# Patient Record
Sex: FEMALE | Race: WHITE | NOT HISPANIC OR LATINO
[De-identification: names, ages, dates, MRNs, and addresses within clinical notes are randomized per-mention and may not be internally consistent; named-entity substitution may affect disease eponyms.]

---

## 2017-04-18 ENCOUNTER — TRANSCRIPTION ENCOUNTER (OUTPATIENT)
Age: 32
End: 2017-04-18

## 2017-10-12 ENCOUNTER — APPOINTMENT (OUTPATIENT)
Dept: DERMATOLOGY | Facility: CLINIC | Age: 32
End: 2017-10-12

## 2017-11-06 ENCOUNTER — APPOINTMENT (OUTPATIENT)
Dept: DERMATOLOGY | Facility: CLINIC | Age: 32
End: 2017-11-06

## 2018-02-26 ENCOUNTER — TRANSCRIPTION ENCOUNTER (OUTPATIENT)
Age: 33
End: 2018-02-26

## 2018-05-24 ENCOUNTER — APPOINTMENT (OUTPATIENT)
Dept: OBGYN | Facility: CLINIC | Age: 33
End: 2018-05-24
Payer: COMMERCIAL

## 2018-05-24 ENCOUNTER — TRANSCRIPTION ENCOUNTER (OUTPATIENT)
Age: 33
End: 2018-05-24

## 2018-05-24 VITALS
SYSTOLIC BLOOD PRESSURE: 131 MMHG | BODY MASS INDEX: 20.73 KG/M2 | DIASTOLIC BLOOD PRESSURE: 83 MMHG | HEIGHT: 66 IN | WEIGHT: 129 LBS

## 2018-05-24 DIAGNOSIS — Z86.39 PERSONAL HISTORY OF OTHER ENDOCRINE, NUTRITIONAL AND METABOLIC DISEASE: ICD-10-CM

## 2018-05-24 DIAGNOSIS — F11.10 OPIOID ABUSE, UNCOMPLICATED: ICD-10-CM

## 2018-05-24 DIAGNOSIS — Z01.419 ENCOUNTER FOR GYNECOLOGICAL EXAMINATION (GENERAL) (ROUTINE) W/OUT ABNORMAL FINDINGS: ICD-10-CM

## 2018-05-24 DIAGNOSIS — N92.3 OVULATION BLEEDING: ICD-10-CM

## 2018-05-24 DIAGNOSIS — Z87.59 PERSONAL HISTORY OF OTHER COMPLICATIONS OF PREGNANCY, CHILDBIRTH AND THE PUERPERIUM: ICD-10-CM

## 2018-05-24 PROCEDURE — 99385 PREV VISIT NEW AGE 18-39: CPT

## 2018-05-24 RX ORDER — CYCLOBENZAPRINE HYDROCHLORIDE 5 MG/1
5 TABLET, FILM COATED ORAL
Qty: 15 | Refills: 0 | Status: COMPLETED | COMMUNITY
Start: 2018-02-26

## 2018-05-24 RX ORDER — MELOXICAM 15 MG/1
15 TABLET ORAL
Qty: 30 | Refills: 0 | Status: COMPLETED | COMMUNITY
Start: 2017-05-12

## 2018-05-24 RX ORDER — OSELTAMIVIR PHOSPHATE 75 MG/1
75 CAPSULE ORAL
Qty: 10 | Refills: 0 | Status: COMPLETED | COMMUNITY
Start: 2018-02-23

## 2018-05-24 RX ORDER — IBUPROFEN 600 MG/1
600 TABLET, FILM COATED ORAL
Qty: 21 | Refills: 0 | Status: COMPLETED | COMMUNITY
Start: 2018-02-26

## 2018-05-24 RX ORDER — BUPRENORPHINE AND NALOXONE 8; 2 MG/1; MG/1
8-2 TABLET SUBLINGUAL
Qty: 60 | Refills: 0 | Status: ACTIVE | COMMUNITY
Start: 2017-09-01

## 2018-05-29 ENCOUNTER — ASOB RESULT (OUTPATIENT)
Age: 33
End: 2018-05-29

## 2018-05-29 ENCOUNTER — TRANSCRIPTION ENCOUNTER (OUTPATIENT)
Age: 33
End: 2018-05-29

## 2018-05-29 ENCOUNTER — APPOINTMENT (OUTPATIENT)
Dept: OBGYN | Facility: CLINIC | Age: 33
End: 2018-05-29
Payer: COMMERCIAL

## 2018-05-29 LAB
C TRACH RRNA SPEC QL NAA+PROBE: NOT DETECTED
HPV HIGH+LOW RISK DNA PNL CVX: NOT DETECTED
N GONORRHOEA RRNA SPEC QL NAA+PROBE: NOT DETECTED
SOURCE AMPLIFICATION: NORMAL
SOURCE AMPLIFICATION: NORMAL
T VAGINALIS RRNA SPEC QL NAA+PROBE: NOT DETECTED

## 2018-05-29 PROCEDURE — 76830 TRANSVAGINAL US NON-OB: CPT

## 2018-05-31 LAB — CYTOLOGY CVX/VAG DOC THIN PREP: NORMAL

## 2018-07-16 ENCOUNTER — OUTPATIENT (OUTPATIENT)
Dept: OUTPATIENT SERVICES | Facility: HOSPITAL | Age: 33
LOS: 1 days | End: 2018-07-16

## 2018-07-16 VITALS
OXYGEN SATURATION: 99 % | HEART RATE: 80 BPM | RESPIRATION RATE: 16 BRPM | HEIGHT: 65.5 IN | WEIGHT: 123.9 LBS | TEMPERATURE: 99 F | DIASTOLIC BLOOD PRESSURE: 80 MMHG | SYSTOLIC BLOOD PRESSURE: 132 MMHG

## 2018-07-16 DIAGNOSIS — N84.0 POLYP OF CORPUS UTERI: ICD-10-CM

## 2018-07-16 DIAGNOSIS — T78.40XA ALLERGY, UNSPECIFIED, INITIAL ENCOUNTER: ICD-10-CM

## 2018-07-16 DIAGNOSIS — F19.20 OTHER PSYCHOACTIVE SUBSTANCE DEPENDENCE, UNCOMPLICATED: ICD-10-CM

## 2018-07-16 DIAGNOSIS — Z98.890 OTHER SPECIFIED POSTPROCEDURAL STATES: Chronic | ICD-10-CM

## 2018-07-16 LAB
ALBUMIN SERPL ELPH-MCNC: 4.8 G/DL — SIGNIFICANT CHANGE UP (ref 3.3–5)
ALP SERPL-CCNC: 46 U/L — SIGNIFICANT CHANGE UP (ref 40–120)
ALT FLD-CCNC: 12 U/L — SIGNIFICANT CHANGE UP (ref 4–33)
AST SERPL-CCNC: 17 U/L — SIGNIFICANT CHANGE UP (ref 4–32)
BILIRUB SERPL-MCNC: 0.3 MG/DL — SIGNIFICANT CHANGE UP (ref 0.2–1.2)
BLD GP AB SCN SERPL QL: NEGATIVE — SIGNIFICANT CHANGE UP
BUN SERPL-MCNC: 15 MG/DL — SIGNIFICANT CHANGE UP (ref 7–23)
CALCIUM SERPL-MCNC: 9.4 MG/DL — SIGNIFICANT CHANGE UP (ref 8.4–10.5)
CHLORIDE SERPL-SCNC: 99 MMOL/L — SIGNIFICANT CHANGE UP (ref 98–107)
CO2 SERPL-SCNC: 25 MMOL/L — SIGNIFICANT CHANGE UP (ref 22–31)
CREAT SERPL-MCNC: 0.69 MG/DL — SIGNIFICANT CHANGE UP (ref 0.5–1.3)
GLUCOSE SERPL-MCNC: 81 MG/DL — SIGNIFICANT CHANGE UP (ref 70–99)
HCG SERPL-ACNC: < 5 MIU/ML — SIGNIFICANT CHANGE UP
HCT VFR BLD CALC: 42.3 % — SIGNIFICANT CHANGE UP (ref 34.5–45)
HGB BLD-MCNC: 14 G/DL — SIGNIFICANT CHANGE UP (ref 11.5–15.5)
MCHC RBC-ENTMCNC: 30.9 PG — SIGNIFICANT CHANGE UP (ref 27–34)
MCHC RBC-ENTMCNC: 33.1 % — SIGNIFICANT CHANGE UP (ref 32–36)
MCV RBC AUTO: 93.4 FL — SIGNIFICANT CHANGE UP (ref 80–100)
NRBC # FLD: 0 — SIGNIFICANT CHANGE UP
PLATELET # BLD AUTO: 176 K/UL — SIGNIFICANT CHANGE UP (ref 150–400)
PMV BLD: 11.2 FL — SIGNIFICANT CHANGE UP (ref 7–13)
POTASSIUM SERPL-MCNC: 4 MMOL/L — SIGNIFICANT CHANGE UP (ref 3.5–5.3)
POTASSIUM SERPL-SCNC: 4 MMOL/L — SIGNIFICANT CHANGE UP (ref 3.5–5.3)
PROT SERPL-MCNC: 7.7 G/DL — SIGNIFICANT CHANGE UP (ref 6–8.3)
RBC # BLD: 4.53 M/UL — SIGNIFICANT CHANGE UP (ref 3.8–5.2)
RBC # FLD: 12.5 % — SIGNIFICANT CHANGE UP (ref 10.3–14.5)
RH IG SCN BLD-IMP: NEGATIVE — SIGNIFICANT CHANGE UP
SODIUM SERPL-SCNC: 138 MMOL/L — SIGNIFICANT CHANGE UP (ref 135–145)
WBC # BLD: 8.97 K/UL — SIGNIFICANT CHANGE UP (ref 3.8–10.5)
WBC # FLD AUTO: 8.97 K/UL — SIGNIFICANT CHANGE UP (ref 3.8–10.5)

## 2018-07-16 NOTE — H&P PST ADULT - NSANTHOSAYNRD_GEN_A_CORE
No. MIK screening performed.  STOP BANG Legend: 0-2 = LOW Risk; 3-4 = INTERMEDIATE Risk; 5-8 = HIGH Risk

## 2018-07-16 NOTE — H&P PST ADULT - FAMILY HISTORY
Mother  Still living? Yes, Estimated age: Age Unknown  Family history of cervical cancer, Age at diagnosis: Age Unknown     Grandparent  Still living? No  Family history of diabetes mellitus, Age at diagnosis: Age Unknown

## 2018-07-16 NOTE — H&P PST ADULT - NSALCOHOLUSECOMMENT_GEN_ALL_CORE_FT
"Subjective:      Adriana Peryr is a 36 y.o. female who presents with Sinus Problem (sinus, ear pain x1 week)            1 week nasal congestion, ear pressure, no drainage from ears, no trauma/barotrauma. Productive cough without blood in sputum. No fever. No SOB. Intermittent wheeze with cough. No PMH asthma/pneumonia. OTC dayquil with min relief. No other aggravating or alleviating factors.          Review of Systems   Constitutional: Positive for malaise/fatigue. Negative for weight loss.   Eyes: Negative for discharge and redness.   Gastrointestinal: Positive for nausea (with dizziness yesterday, now resolved).   Musculoskeletal: Negative for joint pain, myalgias and neck pain.   Skin: Negative for itching and rash.   Neurological: Positive for dizziness (yesterday, now resolved).     .  Medications, Allergies, and current problem list reviewed today in Epic       Objective:     /88   Pulse 85   Temp 37 °C (98.6 °F)   Ht 1.575 m (5' 2\")   Wt 81.6 kg (180 lb)   SpO2 96%   BMI 32.92 kg/m²      Physical Exam   Constitutional: She appears well-developed and well-nourished. No distress.   HENT:   Head: Normocephalic and atraumatic.   Mouth/Throat: Oropharynx is clear and moist.   Nasal congestion  TM's bulging and red with preserved light reflex bilateral   Eyes: Conjunctivae are normal.   Neck: Neck supple.   Cardiovascular: Normal rate, regular rhythm and normal heart sounds.    Pulmonary/Chest: Effort normal and breath sounds normal.   Lymphadenopathy:     She has no cervical adenopathy.   Neurological:   Speech is clear. Patient is appropriate and cooperative.     Skin: Skin is warm and dry. No rash noted.               Assessment/Plan:     1. Acute URI     2. Cough     3. Acute otitis media, unspecified otitis media type  amoxicillin (AMOXIL) 875 MG tablet     Differential diagnosis, natural history, supportive care, and indications for immediate follow-up discussed at length.     "
denies alcohol use

## 2018-07-16 NOTE — H&P PST ADULT - PMH
Anxiety    Hyperthyroidism  patient states resolved had blood work recently with Dr. Acosta - normal  Panic disorder    Vertigo Anxiety    Hyperthyroidism  @ 23 y.o.tx x few years ;  patient states resolved had blood work recently with Dr. Acosta - michael [ 2018}  Panic disorder  7/16/18 pt denies  Vertigo Addiction to drug  Pt reports addiction to narcotics ; pain medication x 6 years dc d 2013. pt presently on suboxone  Anxiety    Hyperthyroidism  @ 23 y.o.tx x few years ;  patient states resolved had blood work recently with Dr. Acosta - michael [ 2018}  Panic disorder  7/16/18 pt denies  Vertigo

## 2018-07-16 NOTE — H&P PST ADULT - PROBLEM SELECTOR PLAN 1
Dilation Curettage Hysteroscopy  Polypectomy with Symphion    Pre op instructions reviewed with pt ; pt appears to have a good understanding of pre op instructions    Pt to DR shankar for pre op m/c

## 2018-07-16 NOTE — H&P PST ADULT - NSSTREETDRUGTY_GEN_ALL_CORE_SD
oxycontin ; prescription pain medications x 6-7 years dc d 2013 oxycontin ; prescription pain medications x 6 years dc d 2013

## 2018-07-16 NOTE — H&P PST ADULT - PROBLEM SELECTOR PLAN 2
Regarding suboxone; pt to see DR Acosta  regarding pre op suboxone plan Pt to see Dr Acosta for pre op medical evaluation and  ;  regarding pre op suboxone plan Pt to see Dr Acosta for pre op medical evaluation and  ;  regarding pre op suboxone plan    7/18/18; regarding suboxone ; reviewed with Dr Linn; as long as surgeon is in agreement ; pt may stay on suboxone ; hold am of surgery  Call to Dr Barth ;  Call to DR Acosta ; s/w Zahraa message left awaiting return call Pt to see Dr Acosta for pre op medical evaluation and  ;  regarding pre op suboxone plan    7/18/18; regarding suboxone ; reviewed with Dr Linn; as long as surgeon is in agreement ; pt may stay on suboxone ; hold am of surgery  Call to Dr Barth ; not in office 7/18  Call to DR Acosta ; s/w Zahraa message left awaiting return call    Call to pt to review ; no answer

## 2018-07-16 NOTE — H&P PST ADULT - HISTORY OF PRESENT ILLNESS
Pt is a 32 y.o. female ; pt reports h/o irregular menses x 3 months ; pt to surgeon ; a sonogram was done " there is a polyp " pt now presents for Dilation curettage Hysteroscopy Polypectomy with Symphion Pt is a 32 y.o. female ; pt reports h/o irregular menses x 3 months ; pt to surgeon ; a sonogram was done " there is a polyp " pt now presents for Dilation Curettage Hysteroscopy Polypectomy with Symphion

## 2018-07-17 PROBLEM — F19.20 OTHER PSYCHOACTIVE SUBSTANCE DEPENDENCE, UNCOMPLICATED: Chronic | Status: ACTIVE | Noted: 2018-07-16

## 2018-07-23 ENCOUNTER — TRANSCRIPTION ENCOUNTER (OUTPATIENT)
Age: 33
End: 2018-07-23

## 2018-07-24 ENCOUNTER — RESULT REVIEW (OUTPATIENT)
Age: 33
End: 2018-07-24

## 2018-07-24 ENCOUNTER — APPOINTMENT (OUTPATIENT)
Dept: OBGYN | Facility: HOSPITAL | Age: 33
End: 2018-07-24

## 2018-07-24 ENCOUNTER — OUTPATIENT (OUTPATIENT)
Dept: OUTPATIENT SERVICES | Facility: HOSPITAL | Age: 33
LOS: 1 days | Discharge: HOME | End: 2018-07-24
Payer: COMMERCIAL

## 2018-07-24 VITALS
TEMPERATURE: 98 F | OXYGEN SATURATION: 96 % | SYSTOLIC BLOOD PRESSURE: 127 MMHG | RESPIRATION RATE: 11 BRPM | HEART RATE: 76 BPM | DIASTOLIC BLOOD PRESSURE: 72 MMHG

## 2018-07-24 VITALS
DIASTOLIC BLOOD PRESSURE: 82 MMHG | HEART RATE: 90 BPM | RESPIRATION RATE: 18 BRPM | TEMPERATURE: 98 F | HEIGHT: 65 IN | OXYGEN SATURATION: 99 % | SYSTOLIC BLOOD PRESSURE: 132 MMHG | WEIGHT: 123.9 LBS

## 2018-07-24 DIAGNOSIS — Z98.890 OTHER SPECIFIED POSTPROCEDURAL STATES: Chronic | ICD-10-CM

## 2018-07-24 DIAGNOSIS — N84.0 POLYP OF CORPUS UTERI: ICD-10-CM

## 2018-07-24 LAB — RH IG SCN BLD-IMP: NEGATIVE — SIGNIFICANT CHANGE UP

## 2018-07-24 PROCEDURE — 58562 HYSTEROSCOPY REMOVE FB: CPT

## 2018-07-24 PROCEDURE — 88305 TISSUE EXAM BY PATHOLOGIST: CPT | Mod: 26

## 2018-07-24 RX ORDER — ACETAMINOPHEN 500 MG
2 TABLET ORAL
Qty: 0 | Refills: 0 | COMMUNITY

## 2018-07-24 RX ORDER — MECLIZINE HCL 12.5 MG
1 TABLET ORAL
Qty: 0 | Refills: 0 | COMMUNITY

## 2018-07-24 RX ORDER — SODIUM CHLORIDE 9 MG/ML
1000 INJECTION, SOLUTION INTRAVENOUS
Qty: 0 | Refills: 0 | Status: DISCONTINUED | OUTPATIENT
Start: 2018-07-24 | End: 2018-07-24

## 2018-07-24 RX ORDER — IBUPROFEN 200 MG
3 TABLET ORAL
Qty: 0 | Refills: 0 | COMMUNITY

## 2018-07-24 RX ORDER — SODIUM CHLORIDE 9 MG/ML
1000 INJECTION, SOLUTION INTRAVENOUS
Qty: 0 | Refills: 0 | Status: DISCONTINUED | OUTPATIENT
Start: 2018-07-24 | End: 2018-07-25

## 2018-07-24 RX ORDER — ONDANSETRON 8 MG/1
4 TABLET, FILM COATED ORAL ONCE
Qty: 0 | Refills: 0 | Status: DISCONTINUED | OUTPATIENT
Start: 2018-07-24 | End: 2018-07-25

## 2018-07-24 RX ORDER — BUPRENORPHINE AND NALOXONE 2; .5 MG/1; MG/1
2 TABLET SUBLINGUAL
Qty: 0 | Refills: 0 | COMMUNITY

## 2018-07-24 RX ADMIN — SODIUM CHLORIDE 125 MILLILITER(S): 9 INJECTION, SOLUTION INTRAVENOUS at 16:12

## 2018-07-24 NOTE — ASU DISCHARGE PLAN (ADULT/PEDIATRIC). - ACTIVITY LEVEL
no douching/nothing per vagina/no tampons/no intercourse/no tub baths no tampons/no tub baths/nothing per vagina/no douching/Nothing in vagina, no intercourse, no douching, no tampons, no tub baths, and no swimming for about 2 weeks or until cleared by MD. Contact your doctor if you are soaking a pad every hour, experience foul smelling discharge, or are noticing blood clots dime-sized or the size of your fist on your pad./no intercourse

## 2018-07-24 NOTE — ASU DISCHARGE PLAN (ADULT/PEDIATRIC). - SPECIAL INSTRUCTIONS
Regular diet.  Resume normal activity as tolerated.  Do not insert anything in vagina (Sex, tampons, douching) for 2 to 4 wks. Call your doctor with any signs and symptoms of infection such as fever, chills, nausea or vomiting. Call your doctor if you're unable to tolerate food or have difficulty urinating.  Call your doctor if you have pain that is not relieved by your prescribed medications.  Notify your doctor with any other concerns.  Follow up with Dr. Hawkins  in 2 weeks.

## 2018-07-24 NOTE — ASU DISCHARGE PLAN (ADULT/PEDIATRIC). - MEDICATION SUMMARY - MEDICATIONS TO TAKE
I will START or STAY ON the medications listed below when I get home from the hospital:    ibuprofen 200 mg oral tablet  -- 3 tab(s) by mouth every 6 hours, As Needed  -- Indication: For Postop pain as needed    Suboxone 8 mg-2 mg sublingual tablet  -- 2 tab(s) under tongue 2 times a day  -- Indication: For h    Aleve 220 mg oral tablet  -- 1 tab(s) by mouth every 8 hours, As Needed  -- Indication: For home    Tylenol 500 mg oral tablet  -- 2 tab(s) by mouth every 6 hours, As Needed  -- Indication: For Pain as needed    meclizine 25 mg oral tablet  -- 1 tab(s) by mouth 3 times a day, As Needed  -- Indication: For home med    Multiple Vitamins oral tablet  -- 1 tab(s) by mouth once a day LD 7/15/18  -- Indication: For home med

## 2018-07-24 NOTE — ASU DISCHARGE PLAN (ADULT/PEDIATRIC). - NOTIFY
GYN Fever>100.4/Numbness, tingling/Bleeding that does not stop/Persistent Nausea and Vomiting/Unable to Urinate/Pain not relieved by Medications/Signs of infection: redness around surgical site, hot and tender to the touch, pus drainage of any kind accompanied by foul odor

## 2018-07-24 NOTE — ASU DISCHARGE PLAN (ADULT/PEDIATRIC). - NURSING INSTRUCTIONS
You were given IV Tylenol (1000mg) for pain management in the Operating Room.  Please do NOT take tylenol/acetaminophen products (Percocet, Vicodin, Excedrin) for the next 6-8 hours (after  930PM).  You were given IV Toradol for pain management in the Operating Room. Please do NOT take Ibuprofen (NSAIDS) products (Motrin, Aleve, Advil) for the next 6-8 hours (After  930PM).   You were given IV antibiotics in the OR. IF you are prescribed oral antibiotics to take at home. Please follow directions on the bottle and start your first dose of antibiotics before bedtime, and schedule your own timing until you finish all antibiotics. DO NOT DISCONTINUE ON YOUR OWN.   Refer to medication reconcillation sheet attached with discharge instruction paperwork.

## 2018-07-24 NOTE — BRIEF OPERATIVE NOTE - OPERATION/FINDINGS
EUA - small anteverted uterus, mobile, no palpable adnexal masses  Hysteroscopy revealed ~4cm polypoid mass extending to internal os, base at left cornual end. Bilateral ostia identified  Small cervical laceration on tenaculum site repaired with figure of 8 stitch. Hemostasis achieved.

## 2018-07-24 NOTE — BRIEF OPERATIVE NOTE - PROCEDURE
<<-----Click on this checkbox to enter Procedure Endocervical curettage  07/24/2018    Active  [email or web address]  Polypectomy of uterus using hysteroscopic tissue removal system with disposable rotating reciprocating cutting blade  07/24/2018    Active  [email or web address]  Hysteroscopy with dilation and curettage of uterus  07/24/2018    Active  [email or web address]

## 2019-01-24 ENCOUNTER — APPOINTMENT (OUTPATIENT)
Dept: OBGYN | Facility: CLINIC | Age: 34
End: 2019-01-24

## 2020-12-16 PROBLEM — Z01.419 ENCOUNTER FOR ANNUAL ROUTINE GYNECOLOGICAL EXAMINATION: Status: RESOLVED | Noted: 2018-05-24 | Resolved: 2020-12-16

## 2023-02-16 ENCOUNTER — APPOINTMENT (OUTPATIENT)
Dept: ORTHOPEDIC SURGERY | Facility: CLINIC | Age: 38
End: 2023-02-16
Payer: COMMERCIAL

## 2023-02-16 VITALS — BODY MASS INDEX: 19.99 KG/M2 | HEIGHT: 65 IN | WEIGHT: 120 LBS

## 2023-02-16 DIAGNOSIS — M17.12 UNILATERAL PRIMARY OSTEOARTHRITIS, LEFT KNEE: ICD-10-CM

## 2023-02-16 DIAGNOSIS — M23.92 UNSPECIFIED INTERNAL DERANGEMENT OF LEFT KNEE: ICD-10-CM

## 2023-02-16 DIAGNOSIS — Z98.890 OTHER SPECIFIED POSTPROCEDURAL STATES: ICD-10-CM

## 2023-02-16 PROCEDURE — 99213 OFFICE O/P EST LOW 20 MIN: CPT

## 2023-02-16 PROCEDURE — 73564 X-RAY EXAM KNEE 4 OR MORE: CPT | Mod: LT

## 2023-02-16 RX ORDER — LEVOTHYROXINE SODIUM 75 UG/1
75 TABLET ORAL
Refills: 0 | Status: ACTIVE | COMMUNITY

## 2023-02-16 NOTE — PHYSICAL EXAM
[Left] : left knee [4___] : quadriceps 4[unfilled]/5 [] : no pain with varus stress [TWNoteComboBox7] : flexion 120 degrees [de-identified] : extension 0 degrees

## 2023-02-16 NOTE — HISTORY OF PRESENT ILLNESS
[8] : 8 [5] : 5 [Sharp] : sharp [Intermittent] : intermittent [Household chores] : household chores [Leisure] : leisure [Work] : work [Nothing helps with pain getting better] : Nothing helps with pain getting better [Sitting] : sitting [Standing] : standing [Walking] : walking [de-identified] : 02/16/23: Here to f/up left knee, reports that over the past 3-4 months she has been experiencing worsening left knee pain. She continues to walk with an altered gait which will worsen throughout the day. \par \par 01/27/21: here for f/up for left knee contracture. going to PT, does not yet have dynasplint. \par 12/16/20: here for f/up left knee. Reports partial gradual improvement with left knee pain, feels that she is able to flex\par the knee more, still with limits of extension. Feels that csi at last visit was helpful. Attending physical therapy. \par 11/11/2020: here for f/up for left knee. obtained all of her records from [facility]. still working with pt and still with\par difficulty with motion. denies any recent injuries. \par 11/4/2020: Ms. Mary Saez, a 34-year-old female, presents today for left knee. She reports a few months ago,\par injured her left knee with intermittent pain following. She reports that she experienced an episode of intense pain and\par had difficulty with WB and felt that the knee was locked, she then went to [facility] in August, was told she had a torn\par meniscus and underwent arthroscopy 08.04.2020. She was started in PT 4 weeks post-op. She feels that she has not\par seen improvement since the surgery. Initially saw Dr. Burden who ordered MRI and referred here for consult. Pt will be\par having records from [facility] forwarded to out office. [] : no [FreeTextEntry1] : left knee  [FreeTextEntry5] : Patient is here to follow up on left knee. Was last seen by Edith in 2021 for S/P arthroscopy of left knee, and dislocation of patella. Sx was done by a different ortho in 2020. Tried PT. Has tried CSI, and felt relief. Noticed pain in 11/2022. No recent injury. Swelling. Pain will radiate intermittently into quad and shin. Pain is worse with prolonged standing and rest. Has not seen anyone for pain. Tried Aleve and ice.  [FreeTextEntry7] : quad and shin

## 2023-02-16 NOTE — DISCUSSION/SUMMARY
[de-identified] : 37f with left knee djd, limited ROM, hx of arthroscopy and atrophy of the LLE. \par 1) discussed availability of visco injections and pt would like to proceed. will request auth for a series of injections.\par 2) cryotherapy, rest and activity modification\par 3) start physical therapy\par 4) rtc with auth for injections\par \par Entered by Maryam Fournier acting as scribe.\par

## 2023-02-17 RX ORDER — HYALURONATE SODIUM 20 MG/2 ML
20 SYRINGE (ML) INTRAARTICULAR
Qty: 3 | Refills: 0 | Status: ACTIVE | COMMUNITY
Start: 2023-02-17 | End: 1900-01-01

## 2023-04-13 ENCOUNTER — NON-APPOINTMENT (OUTPATIENT)
Age: 38
End: 2023-04-13

## 2023-05-15 NOTE — ASU PATIENT PROFILE, ADULT - NSTOBACCO QUIT READY_GEN_A_CORE_SD
Patient stopped by today and scheduled an appointment with Dr De Los Santos to get medication renewal for thyroid and blood pressure medication for June 1, 2023. Patient also scheduled an appointment to establish care/AWV with Dr Washington on 06/28/2023, because Dr Young is leaving. Patient wants a lab order to get her labs done a week before her AWV.    ready to quit

## 2023-10-28 ENCOUNTER — NON-APPOINTMENT (OUTPATIENT)
Age: 38
End: 2023-10-28

## 2024-05-02 ENCOUNTER — NON-APPOINTMENT (OUTPATIENT)
Age: 39
End: 2024-05-02

## 2024-05-07 ENCOUNTER — NON-APPOINTMENT (OUTPATIENT)
Age: 39
End: 2024-05-07

## 2024-05-08 ENCOUNTER — APPOINTMENT (OUTPATIENT)
Dept: OBGYN | Facility: CLINIC | Age: 39
End: 2024-05-08
Payer: COMMERCIAL

## 2024-05-08 VITALS
DIASTOLIC BLOOD PRESSURE: 95 MMHG | SYSTOLIC BLOOD PRESSURE: 152 MMHG | HEIGHT: 65 IN | WEIGHT: 130 LBS | BODY MASS INDEX: 21.66 KG/M2

## 2024-05-08 DIAGNOSIS — Z01.419 ENCOUNTER FOR GYNECOLOGICAL EXAMINATION (GENERAL) (ROUTINE) W/OUT ABNORMAL FINDINGS: ICD-10-CM

## 2024-05-08 DIAGNOSIS — R92.30 DENSE BREASTS, UNSPECIFIED: ICD-10-CM

## 2024-05-08 PROCEDURE — 99385 PREV VISIT NEW AGE 18-39: CPT

## 2024-05-08 NOTE — HISTORY OF PRESENT ILLNESS
[FreeTextEntry1] : pt is a 39 y/o p0010 lmp 4/28 with recent dub and acne , ttc unprotected intercourse x 10 years one miscarriage

## 2024-05-12 ENCOUNTER — TRANSCRIPTION ENCOUNTER (OUTPATIENT)
Age: 39
End: 2024-05-12

## 2024-05-13 LAB — HPV HIGH+LOW RISK DNA PNL CVX: NOT DETECTED

## 2024-05-14 LAB — CYTOLOGY CVX/VAG DOC THIN PREP: NORMAL

## 2025-03-05 ENCOUNTER — NON-APPOINTMENT (OUTPATIENT)
Age: 40
End: 2025-03-05